# Patient Record
Sex: MALE | URBAN - METROPOLITAN AREA
[De-identification: names, ages, dates, MRNs, and addresses within clinical notes are randomized per-mention and may not be internally consistent; named-entity substitution may affect disease eponyms.]

---

## 2021-04-13 DIAGNOSIS — Z23 ENCOUNTER FOR IMMUNIZATION: ICD-10-CM

## 2023-09-27 ENCOUNTER — BMR PREADMISSION ASSESSMENT (INPATIENT)
Dept: ADMISSIONS | Facility: REHABILITATION | Age: 78
End: 2023-09-27
Payer: MEDICARE

## 2025-07-21 ENCOUNTER — HOSPITAL ENCOUNTER (INPATIENT)
Age: 80
LOS: 2 days | Discharge: HOME HEALTH | DRG: 812 | End: 2025-07-23
Attending: INTERNAL MEDICINE | Admitting: INTERNAL MEDICINE
Payer: MEDICARE

## 2025-07-21 DIAGNOSIS — K92.2 GIB (GASTROINTESTINAL BLEEDING): Primary | ICD-10-CM

## 2025-07-21 DIAGNOSIS — F03.90 DEMENTIA (HCC): ICD-10-CM

## 2025-07-21 DIAGNOSIS — D64.9 ANEMIA: ICD-10-CM

## 2025-07-21 PROCEDURE — 83880 ASSAY OF NATRIURETIC PEPTIDE: CPT

## 2025-07-21 PROCEDURE — 9990

## 2025-07-21 PROCEDURE — 86901 BLOOD TYPING SEROLOGIC RH(D): CPT

## 2025-07-21 PROCEDURE — 30233N1 TRANSFUSION OF NONAUTOLOGOUS RED BLOOD CELLS INTO PERIPHERAL VEIN, PERCUTANEOUS APPROACH: ICD-10-PCS | Performed by: INTERNAL MEDICINE

## 2025-07-21 PROCEDURE — 83735 ASSAY OF MAGNESIUM: CPT

## 2025-07-21 PROCEDURE — 86920 COMPATIBILITY TEST SPIN: CPT

## 2025-07-21 PROCEDURE — 82728 ASSAY OF FERRITIN: CPT

## 2025-07-21 PROCEDURE — 71045 X-RAY EXAM CHEST 1 VIEW: CPT

## 2025-07-21 PROCEDURE — 85025 COMPLETE CBC W/AUTO DIFF WBC: CPT

## 2025-07-21 PROCEDURE — 86850 RBC ANTIBODY SCREEN: CPT

## 2025-07-21 PROCEDURE — 85610 PROTHROMBIN TIME: CPT

## 2025-07-21 PROCEDURE — 80053 COMPREHEN METABOLIC PANEL: CPT

## 2025-07-21 PROCEDURE — 99291 CRITICAL CARE FIRST HOUR: CPT

## 2025-07-21 PROCEDURE — 71010: CPT

## 2025-07-21 PROCEDURE — P9016 RBC LEUKOCYTES REDUCED: HCPCS

## 2025-07-21 PROCEDURE — 93005 ELECTROCARDIOGRAM TRACING: CPT

## 2025-07-21 PROCEDURE — 86900 BLOOD TYPING SEROLOGIC ABO: CPT

## 2025-07-21 PROCEDURE — 84466 ASSAY OF TRANSFERRIN: CPT

## 2025-07-21 PROCEDURE — 36415 COLL VENOUS BLD VENIPUNCTURE: CPT

## 2025-07-21 PROCEDURE — 85730 THROMBOPLASTIN TIME PARTIAL: CPT

## 2025-07-21 PROCEDURE — 96374 THER/PROPH/DIAG INJ IV PUSH: CPT

## 2025-07-21 PROCEDURE — 83540 ASSAY OF IRON: CPT

## 2025-07-22 PROBLEM — M10.9 GOUT: Status: ACTIVE | Noted: 2025-07-22

## 2025-07-22 PROBLEM — I50.9 CONGESTIVE HEART FAILURE (CHF) (HCC): Status: ACTIVE | Noted: 2025-07-22

## 2025-07-22 PROBLEM — D64.9 SYMPTOMATIC ANEMIA: Status: ACTIVE | Noted: 2025-07-22

## 2025-07-22 PROBLEM — F03.90 DEMENTIA (HCC): Status: ACTIVE | Noted: 2025-07-22

## 2025-07-22 LAB
ALBUMIN SERPL BCG-MCNC: 3.4 G/DL (ref 3.5–5.2)
ALP SERPL-CCNC: 200 U/L (ref 35–160)
ALT SERPL W P-5'-P-CCNC: 15 U/L (ref 5–33)
ANION GAP SERPL CALCULATED.3IONS-SCNC: 12 MMOL/L (ref 7–16)
AST SERPL W P-5'-P-CCNC: 25 U/L (ref 5–40)
BASOPHILS # BLD AUTO: 0.06 THOUSANDS/ÂΜL (ref 0–0.1)
BASOPHILS NFR BLD AUTO: 1 % (ref 0–1)
BILIRUB SERPL-MCNC: 1.5 MG/DL (ref 0.2–1.2)
BUN SERPL-MCNC: 38 MG/DL (ref 8–23)
CALCIUM ALBUM COR SERPL-MCNC: 9.6 MG/DL (ref 8.3–10.1)
CALCIUM SERPL-MCNC: 9.1 MG/DL (ref 8.6–10.2)
CHLORIDE SERPL-SCNC: 99 MMOL/L (ref 98–107)
CHOLEST SERPL-MCNC: 99 MG/DL (ref 140–200)
CO2 SERPL-SCNC: 30 MMOL/L (ref 22–29)
CREAT SERPL-MCNC: 1.71 MG/DL (ref 0.7–1.2)
EOSINOPHIL # BLD AUTO: 0.15 THOUSAND/ÂΜL (ref 0–0.61)
EOSINOPHIL NFR BLD AUTO: 2 % (ref 0–6)
ERYTHROCYTE [DISTWIDTH] IN BLOOD BY AUTOMATED COUNT: 18.3 % (ref 11.6–15.1)
GFR SERPL CREATININE-BSD FRML MDRD: 37 ML/MIN/1.73SQ M
GLUCOSE SERPL-MCNC: 111 MG/DL (ref 65–140)
HCT VFR BLD AUTO: 24.1 % (ref 36.5–49.3)
HCT VFR BLD AUTO: 27.1 % (ref 36.5–49.3)
HCT VFR BLD AUTO: 27.4 % (ref 36.5–49.3)
HDLC SERPL-MCNC: 55 MG/DL (ref 40–60)
HGB BLD-MCNC: 7.6 G/DL (ref 12–17)
HGB BLD-MCNC: 8.4 G/DL (ref 12–17)
HGB BLD-MCNC: 8.5 G/DL (ref 12–17)
IMM GRANULOCYTES # BLD AUTO: 0.03 THOUSAND/UL (ref 0–0.2)
IMM GRANULOCYTES NFR BLD AUTO: 0 % (ref 0–2)
LDLC SERPL CALC-MCNC: 36 MG/DL (ref 0–100)
LYMPHOCYTES # BLD AUTO: 1.87 THOUSANDS/ÂΜL (ref 0.6–4.47)
LYMPHOCYTES NFR BLD AUTO: 27 % (ref 14–44)
MCH RBC QN AUTO: 23.9 PG (ref 26.8–34.3)
MCHC RBC AUTO-ENTMCNC: 31.5 G/DL (ref 31.4–37.4)
MCV RBC AUTO: 76 FL (ref 82–98)
MONOCYTES # BLD AUTO: 1.59 THOUSAND/ÂΜL (ref 0.17–1.22)
MONOCYTES NFR BLD AUTO: 23 % (ref 4–12)
NEUTROPHILS # BLD AUTO: 3.2 THOUSANDS/ÂΜL (ref 1.85–7.62)
NEUTS SEG NFR BLD AUTO: 47 % (ref 43–75)
NONHDLC SERPL-MCNC: 44 MG/DL
NRBC BLD AUTO-RTO: 1 /100 WBCS
PLATELET # BLD AUTO: 402 THOUSANDS/UL (ref 149–390)
PMV BLD AUTO: 9.7 FL (ref 8.9–12.7)
POTASSIUM SERPL-SCNC: 3.3 MMOL/L (ref 3.5–5.1)
PROT SERPL-MCNC: 6.2 G/DL (ref 6.4–8.3)
RBC # BLD AUTO: 3.18 MILLION/UL (ref 3.88–5.62)
SODIUM SERPL-SCNC: 141 MMOL/L (ref 136–145)
TRIGL SERPL-MCNC: 41 MG/DL (ref 60–150)
WBC # BLD AUTO: 6.9 THOUSAND/UL (ref 4.31–10.16)

## 2025-07-22 PROCEDURE — 80061 LIPID PANEL: CPT | Performed by: INTERNAL MEDICINE

## 2025-07-22 PROCEDURE — 80053 COMPREHEN METABOLIC PANEL: CPT | Performed by: INTERNAL MEDICINE

## 2025-07-22 PROCEDURE — 85025 COMPLETE CBC W/AUTO DIFF WBC: CPT | Performed by: INTERNAL MEDICINE

## 2025-07-22 PROCEDURE — 85014 HEMATOCRIT: CPT | Performed by: INTERNAL MEDICINE

## 2025-07-22 PROCEDURE — 85018 HEMOGLOBIN: CPT | Performed by: INTERNAL MEDICINE

## 2025-07-22 RX ORDER — MIRABEGRON 50 MG/1
50 TABLET, FILM COATED, EXTENDED RELEASE ORAL DAILY
COMMUNITY

## 2025-07-22 RX ORDER — ACETAMINOPHEN 325 MG/1
650 TABLET ORAL EVERY 4 HOURS PRN
COMMUNITY

## 2025-07-22 RX ORDER — GABAPENTIN 300 MG/1
300 CAPSULE ORAL
Status: DISCONTINUED | OUTPATIENT
Start: 2025-07-22 | End: 2025-07-23 | Stop reason: HOSPADM

## 2025-07-22 RX ORDER — REVEFENACIN 175 UG/3ML
175 SOLUTION RESPIRATORY (INHALATION) DAILY
COMMUNITY

## 2025-07-22 RX ORDER — RAMELTEON 8 MG/1
8 TABLET ORAL
Status: DISCONTINUED | OUTPATIENT
Start: 2025-07-22 | End: 2025-07-22

## 2025-07-22 RX ORDER — ESCITALOPRAM OXALATE 20 MG/1
20 TABLET ORAL DAILY
COMMUNITY

## 2025-07-22 RX ORDER — ESCITALOPRAM OXALATE 10 MG/1
20 TABLET ORAL DAILY
Status: DISCONTINUED | OUTPATIENT
Start: 2025-07-22 | End: 2025-07-23 | Stop reason: HOSPADM

## 2025-07-22 RX ORDER — GABAPENTIN 300 MG/1
300 CAPSULE ORAL
COMMUNITY

## 2025-07-22 RX ORDER — ACETAMINOPHEN 650 MG/1
650 SUPPOSITORY RECTAL EVERY 4 HOURS PRN
Status: DISCONTINUED | OUTPATIENT
Start: 2025-07-22 | End: 2025-07-23 | Stop reason: HOSPADM

## 2025-07-22 RX ORDER — PRAVASTATIN SODIUM 20 MG
20 TABLET ORAL DAILY
Status: DISCONTINUED | OUTPATIENT
Start: 2025-07-22 | End: 2025-07-23 | Stop reason: HOSPADM

## 2025-07-22 RX ORDER — ACETAMINOPHEN 325 MG/1
650 TABLET ORAL EVERY 4 HOURS PRN
Status: DISCONTINUED | OUTPATIENT
Start: 2025-07-22 | End: 2025-07-23 | Stop reason: HOSPADM

## 2025-07-22 RX ORDER — RIVASTIGMINE TARTRATE 4.5 MG/1
4.5 CAPSULE ORAL 2 TIMES DAILY
COMMUNITY

## 2025-07-22 RX ORDER — ALLOPURINOL 100 MG/1
200 TABLET ORAL DAILY
Status: DISCONTINUED | OUTPATIENT
Start: 2025-07-22 | End: 2025-07-23 | Stop reason: HOSPADM

## 2025-07-22 RX ORDER — ONDANSETRON 2 MG/ML
4 INJECTION INTRAMUSCULAR; INTRAVENOUS EVERY 6 HOURS PRN
Status: DISCONTINUED | OUTPATIENT
Start: 2025-07-22 | End: 2025-07-23 | Stop reason: HOSPADM

## 2025-07-22 RX ORDER — MIRABEGRON 50 MG/1
50 TABLET, FILM COATED, EXTENDED RELEASE ORAL DAILY
Status: DISCONTINUED | OUTPATIENT
Start: 2025-07-22 | End: 2025-07-23 | Stop reason: HOSPADM

## 2025-07-22 RX ORDER — BUDESONIDE AND FORMOTEROL FUMARATE DIHYDRATE 80; 4.5 UG/1; UG/1
2 AEROSOL RESPIRATORY (INHALATION) 2 TIMES DAILY
COMMUNITY

## 2025-07-22 RX ORDER — RIVASTIGMINE TARTRATE 1.5 MG/1
4.5 CAPSULE ORAL 2 TIMES DAILY
Status: DISCONTINUED | OUTPATIENT
Start: 2025-07-22 | End: 2025-07-23 | Stop reason: HOSPADM

## 2025-07-22 RX ORDER — PRAVASTATIN SODIUM 20 MG
20 TABLET ORAL DAILY
COMMUNITY

## 2025-07-22 RX ORDER — LANOLIN ALCOHOL/MO/W.PET/CERES
1000 CREAM (GRAM) TOPICAL DAILY
COMMUNITY

## 2025-07-22 RX ORDER — MIDODRINE HYDROCHLORIDE 2.5 MG/1
2.5 TABLET ORAL DAILY
COMMUNITY

## 2025-07-22 RX ORDER — MIDODRINE HYDROCHLORIDE 5 MG/1
2.5 TABLET ORAL DAILY
Status: DISCONTINUED | OUTPATIENT
Start: 2025-07-22 | End: 2025-07-23 | Stop reason: HOSPADM

## 2025-07-22 RX ORDER — BUMETANIDE 2 MG/1
2 TABLET ORAL 2 TIMES DAILY
COMMUNITY

## 2025-07-22 RX ORDER — POLYETHYLENE GLYCOL 3350 17 G/17G
17 POWDER, FOR SOLUTION ORAL DAILY
COMMUNITY

## 2025-07-22 RX ORDER — POTASSIUM CHLORIDE 750 MG/1
20 CAPSULE, EXTENDED RELEASE ORAL ONCE
Status: COMPLETED | OUTPATIENT
Start: 2025-07-22 | End: 2025-07-22

## 2025-07-22 RX ORDER — BUMETANIDE 2 MG/1
2 TABLET ORAL 2 TIMES DAILY
Status: DISCONTINUED | OUTPATIENT
Start: 2025-07-22 | End: 2025-07-23 | Stop reason: HOSPADM

## 2025-07-22 RX ORDER — MAGNESIUM HYDROXIDE/ALUMINUM HYDROXICE/SIMETHICONE 120; 1200; 1200 MG/30ML; MG/30ML; MG/30ML
15 SUSPENSION ORAL
Status: DISCONTINUED | OUTPATIENT
Start: 2025-07-22 | End: 2025-07-23 | Stop reason: HOSPADM

## 2025-07-22 RX ORDER — ALLOPURINOL 100 MG/1
200 TABLET ORAL DAILY
COMMUNITY

## 2025-07-22 RX ADMIN — ESCITALOPRAM OXALATE 20 MG: 10 TABLET ORAL at 14:06

## 2025-07-22 RX ADMIN — PRAVASTATIN SODIUM 20 MG: 20 TABLET ORAL at 16:30

## 2025-07-22 RX ADMIN — POTASSIUM CHLORIDE 20 MEQ: 750 CAPSULE, EXTENDED RELEASE ORAL at 16:30

## 2025-07-22 RX ADMIN — ALLOPURINOL 200 MG: 100 TABLET ORAL at 14:07

## 2025-07-22 RX ADMIN — BUMETANIDE 2 MG: 2 TABLET ORAL at 19:10

## 2025-07-22 RX ADMIN — GABAPENTIN 300 MG: 300 CAPSULE ORAL at 21:04

## 2025-07-22 RX ADMIN — RIVASTIGMINE TARTRATE 4.5 MG: 1.5 CAPSULE ORAL at 19:10

## 2025-07-22 RX ADMIN — MIDODRINE HYDROCHLORIDE 2.5 MG: 5 TABLET ORAL at 14:54

## 2025-07-22 NOTE — ASSESSMENT & PLAN NOTE
Wife reports that patient has been feeling very tired  Hemo-globin was found to be low  Recently had nasal bleed, no active bleed visible as of now  Status post blood transfusion for possible symptomatic anemia  Still occult was positive, GI consulted  Monitor H&H  If hemoglobin less than 7  Recently has heart doctor has reduced her Xarelto from 20 mg to 10 mg for his A-fib

## 2025-07-22 NOTE — PROGRESS NOTES
Progress Note - Hospitalist   Name: Ben Alcaraz 79 y.o. male I MRN: [id number]  Unit/Bed#: 4 D 486-01 I Date of Admission: 7/21/2025   Date of Service: 7/22/2025 I Hospital Day: 1    Assessment & Plan  Symptomatic anemia  Wife reports that patient has been feeling very tired  Hemo-globin was found to be low  Recently had nasal bleed, no active bleed visible as of now  Status post blood transfusion for possible symptomatic anemia  Still occult was positive, GI consulted  Monitor H&H  If hemoglobin less than 7  Recently has heart doctor has reduced her Xarelto from 20 mg to 10 mg for his A-fib    Dementia (HCC)    Congestive heart failure (CHF) ([facility])  Wt Readings from Last 3 Encounters:   07/22/25 87.1 kg (192 lb)     Continue home medications Bumex        Gout  Continue allopurinol    VTE Pharmacologic Prophylaxis:   Moderate Risk (Score 3-4) - Pharmacological DVT Prophylaxis Ordered: SCDs only as patient came with symptomatic anemia and possible GI bleed so chemical prophylaxis at this time before GI evaluation is contraindicated.        Education and Discussions with Family / Patient: Updated  (wife) at bedside.    Current Length of Stay: 1 day(s)  Current Patient Status: Inpatient   Certification Statement: Monitoring of his anemia, transfusion and GI evaluation.  discharge Plan: Anticipate discharge in 24-48 hrs to home.    Code Status: No Order    Subjective   Patient seen and examined bedside denies any complaints to me.    Objective :  Temp:  [97.5 °F (36.4 °C)-97.6 °F (36.4 °C)] 97.6 °F (36.4 °C)  HR:  [68-71] 68  BP: (107-113)/(64-65) 107/64  Resp:  [18-19] 18  SpO2:  [100 %] 100 %  O2 Device: None (Room air)    Body mass index is 24.65 kg/m².     Input and Output Summary (last 24 hours):     Intake/Output Summary (Last 24 hours) at 7/22/2025 1949  Last data filed at 7/22/2025 0601  Gross per 24 hour   Intake 375 ml   Output 450 ml   Net -75 ml       Physical Exam  NCAT  Moist mucous  membranes  S1-S2 normal  Clear lungs  Soft abdomen nontender positive bowel sounds  Alert oriented 1-2.  Dementia  No motor deficits    Lines/Drains:              Lab Results: I have reviewed the following results:   Results from last 7 days   Lab Units 07/22/25  1729 07/22/25  1204 07/22/25  0320   WBC Thousand/uL  --   --  6.90   HEMOGLOBIN g/dL 8.4*   < > 7.6*   HEMATOCRIT % 27.4*   < > 24.1*   PLATELETS Thousands/uL  --   --  402*   SEGS PCT %  --   --  47   LYMPHO PCT %  --   --  27   MONO PCT %  --   --  23*   EOS PCT %  --   --  2    < > = values in this interval not displayed.     Results from last 7 days   Lab Units 07/22/25  0320   SODIUM mmol/L 141   POTASSIUM mmol/L 3.3*   CHLORIDE mmol/L 99   CO2 mmol/L 30*   BUN mg/dL 38*   CREATININE mg/dL 1.71*   ANION GAP mmol/L 12   CALCIUM mg/dL 9.1   ALBUMIN g/dL 3.4*   TOTAL BILIRUBIN mg/dL 1.50*   ALK PHOS U/L 200*   ALT U/L 15   AST U/L 25   GLUCOSE RANDOM mg/dL 111                                   Last 24 Hours Medication List:     Current Facility-Administered Medications:     acetaminophen (TYLENOL) tablet 650 mg, Q4H PRN **OR** acetaminophen (TYLENOL) rectal suppository 650 mg, Q4H PRN    allopurinol (ZYLOPRIM) tablet 200 mg, Daily    aluminum-magnesium hydroxide-simethicone (MAALOX) oral suspension 15 mL, Q1H PRN    bumetanide (BUMEX) tablet 2 mg, BID    escitalopram (LEXAPRO) tablet 20 mg, Daily    gabapentin (NEURONTIN) capsule 300 mg, HS    melatonin tablet 3 mg, HS PRN    midodrine (PROAMATINE) tablet 2.5 mg, Daily    Mirabegron ER TB24 50 mg, Daily    ondansetron (ZOFRAN) injection 4 mg, Q6H PRN    pravastatin (PRAVACHOL) tablet 20 mg, Daily    [Held by provider] rivaroxaban (XARELTO) tablet 15 mg, After Dinner    rivastigmine (EXELON) capsule 4.5 mg, BID    Administrative Statements   Today, Patient Was Seen By: Dora Steven MD      **Please Note: This note may have been constructed using a voice recognition system.**

## 2025-07-22 NOTE — PLAN OF CARE
Problem: PAIN - ADULT  Goal: Verbalizes/displays adequate comfort level or baseline comfort level  Description: Interventions:  - Encourage patient to monitor pain and request assistance  - Assess pain using appropriate pain scale  - Administer analgesics as ordered based on type and severity of pain and evaluate response  - Implement non-pharmacological measures as appropriate and evaluate response  - Consider cultural and social influences on pain and pain management  - Notify physician/advanced practitioner if interventions unsuccessful or patient reports new pain  - Educate patient/family on pain management process including their role and importance of  reporting pain   - Provide non-pharmacologic/complimentary pain relief interventions  Outcome: Progressing     Problem: INFECTION - ADULT  Goal: Absence or prevention of progression during hospitalization  Description: INTERVENTIONS:  - Assess and monitor for signs and symptoms of infection  - Monitor lab/diagnostic results  - Monitor all insertion sites, i.e. indwelling lines, tubes, and drains  - Monitor endotracheal if appropriate and nasal secretions for changes in amount and color  - [place] appropriate cooling/warming therapies per order  - Administer medications as ordered  - Instruct and encourage patient and family to use good hand hygiene technique  - Identify and instruct in appropriate isolation precautions for identified infection/condition  Outcome: Progressing  Goal: Absence of fever/infection during neutropenic period  Description: INTERVENTIONS:  - Monitor WBC  - Perform strict hand hygiene  - Limit to healthy visitors only  - No plants, dried, fresh or silk flowers with gamboa in patient room  Outcome: Progressing     Problem: SAFETY ADULT  Goal: Patient will remain free of falls  Description: INTERVENTIONS:  - Educate patient/family on patient safety including physical limitations  - Instruct patient to call for assistance with activity   -  Consider consulting OT/PT to assist with strengthening/mobility based on AM PAC & JH-HLM score  - Consult OT/PT to assist with strengthening/mobility   - Keep Call bell within reach  - Keep bed low and locked with side rails adjusted as appropriate  - Keep care items and personal belongings within reach  - Initiate and maintain comfort rounds  - Make Fall Risk Sign visible to staff  - Offer Toileting every 2 Hours, in advance of need  - Initiate/Maintain bed/chair alarm  - Apply yellow socks and bracelet for high fall risk patients  - Consider moving patient to room near nurses station  Outcome: Progressing  Goal: Maintain or return to baseline ADL function  Description: INTERVENTIONS:  -  Assess patient's ability to carry out ADLs; assess patient's baseline for ADL function and identify physical deficits which impact ability to perform ADLs (bathing, care of mouth/teeth, toileting, grooming, dressing, etc.)  - Assess/evaluate cause of self-care deficits   - Assess range of motion  - Assess patient's mobility; develop plan if impaired  - Assess patient's need for assistive devices and provide as appropriate  - Encourage maximum independence but intervene and supervise when necessary  - Involve family in performance of ADLs  - Assess for home care needs following discharge   - Consider OT consult to assist with ADL evaluation and planning for discharge  - Provide patient education as appropriate  - Monitor functional capacity and physical performance, use of AM PAC & JH-HLM   - Monitor gait, balance and fatigue with ambulation    Outcome: Progressing  Goal: Maintains/Returns to pre admission functional level  Description: INTERVENTIONS:  - Perform AM-PAC 6 Click Basic Mobility/ Daily Activity assessment daily.  - Set and communicate daily mobility goal to care team and patient/family/caregiver.   - Collaborate with rehabilitation services on mobility goals if consulted  Patient ambulates to bathroom x1 assist   -  Record patient progress and toleration of activity level   Outcome: Progressing     Problem: DISCHARGE PLANNING  Goal: Discharge to home or other facility with appropriate resources  Description: INTERVENTIONS:  - Identify barriers to discharge w/patient and caregiver  - Arrange for needed discharge resources and transportation as appropriate  - Identify discharge learning needs (meds, wound care, etc.)  - Arrange for interpretive services to assist at discharge as needed  - Refer to Case Management Department for coordinating discharge planning if the patient needs post-hospital services based on physician/advanced practitioner order or complex needs related to functional status, cognitive ability, or social support system  Outcome: Progressing     Problem: Knowledge Deficit  Goal: Patient/family/caregiver demonstrates understanding of disease process, treatment plan, medications, and discharge instructions  Description: Complete learning assessment and assess knowledge base.  Interventions:  - Provide teaching at level of understanding  - Provide teaching via preferred learning methods  Outcome: Progressing

## 2025-07-22 NOTE — ASSESSMENT & PLAN NOTE
Wt Readings from Last 3 Encounters:   07/22/25 87.1 kg (192 lb)     Continue home medications Bumex

## 2025-07-22 NOTE — CASE MANAGEMENT
Case Management Assessment & Discharge Planning Note    Patient name Ben Alcaraz  Location 4 D 486/4 D 486-01 MRN [id number]  : 1945 Date 2025       Current Admission Date: 2025  Current Admission Diagnosis:Low hemoglobin   There are no active problems to display for this patient.     LOS (days): 1  Geometric Mean LOS (GMLOS) (days):   Days to GMLOS:     OBJECTIVE:    Risk of Unplanned Readmission Score: 8.35         Current admission status: Inpatient       Preferred Pharmacy:   [facility]/pharmacy #[id number] - [place] - [address]  [address]  [place] [address]  Phone: [phone] Fax: [phone]    Primary Care Provider: Elaine Cardenas DO    Primary Insurance: MEDICARE  Secondary Insurance: AET[employer]    ASSESSMENT:  Active Health Care Proxies    There are no active Health Care Proxies on file.       Advance Directives  Does patient have a Health Care POA?: Yes (Documentation no in EHR.)  Does patient have Advance Directives?: Yes  Advance Directives: Power of  for health care  Primary Contact: Tanya Alcaraz, wife              Patient Information  Admitted from:: Home  Mental Status: Alert  During Assessment patient was accompanied by: Spouse  Assessment information provided by:: Patient, Spouse  Primary Caregiver: Self  Support Systems: Spouse/significant other  County of Residence: [place]  [place] [facility] do you live in?: [place]  Home entry access options. Select all that apply.: Stairs  Number of steps to enter home.: 1  Do the steps have railings?: No  Type of Current Residence: [address] home  Upon entering residence, is there a bedroom on the main floor (no further steps)?: Yes  Upon entering residence, is there a bathroom on the main floor (no further steps)?: Yes  Living Arrangements: Lives w/ Spouse/significant other  Is patient a ?: No    Activities of Daily Living Prior to Admission  Functional Status: Independent  Completes ADLs  independently?: Yes  Ambulates independently?: Yes  Does patient currently own DME?: No  Does patient have a history of Outpatient Therapy (PT/OT)?: Yes ([facility])  Does the patient have a history of Short-Term Rehab?: Yes ([facility])  Does patient have a history of HHC?: Yes  Does patient currently have HHC?: No         Patient Information Continued  Income Source: Pension/[employer]  Does patient have prescription coverage?: Yes  Can the patient afford their medications and any related supplies (such as glucometers or test strips)?: Yes  Does patient receive dialysis treatments?: No  Does patient have a history of substance abuse?: No  Does patient have a history of Mental Health Diagnosis?: No         Means of Transportation  Means of Transport to Appts:: Family transport          DISCHARGE DETAILS:    Discharge planning discussed with:: Patient and spouse, Tanya.                                                                                               Additional Comments: Met with pt and wife at bedside. Wife concerned about how long pt stay would be due to history of inpatient stay causing delirium. Pt has medications to assist and she wanted CM to ensure these medications could be provided. CM followed up with RN who messaged Dr. Steven to see pt and wife to discuss.

## 2025-07-23 ENCOUNTER — TELEPHONE (OUTPATIENT)
Age: 80
End: 2025-07-23

## 2025-07-23 VITALS
DIASTOLIC BLOOD PRESSURE: 53 MMHG | OXYGEN SATURATION: 100 % | BODY MASS INDEX: 23.57 KG/M2 | TEMPERATURE: 98.3 F | RESPIRATION RATE: 18 BRPM | HEART RATE: 61 BPM | HEIGHT: 74 IN | WEIGHT: 183.64 LBS | SYSTOLIC BLOOD PRESSURE: 96 MMHG

## 2025-07-23 PROBLEM — I48.20 CHRONIC ATRIAL FIBRILLATION (HCC): Status: ACTIVE | Noted: 2025-07-23

## 2025-07-23 LAB
ANION GAP SERPL CALCULATED.3IONS-SCNC: 13 MMOL/L (ref 7–16)
BUN SERPL-MCNC: 24 MG/DL (ref 8–23)
CALCIUM SERPL-MCNC: 9.4 MG/DL (ref 8.6–10.2)
CHLORIDE SERPL-SCNC: 100 MMOL/L (ref 98–107)
CO2 SERPL-SCNC: 27 MMOL/L (ref 22–29)
CREAT SERPL-MCNC: 1.34 MG/DL (ref 0.7–1.2)
GFR SERPL CREATININE-BSD FRML MDRD: 50 ML/MIN/1.73SQ M
GLUCOSE SERPL-MCNC: 79 MG/DL (ref 65–140)
HCT VFR BLD AUTO: 27.4 % (ref 36.5–49.3)
HCT VFR BLD AUTO: 28 % (ref 36.5–49.3)
HCT VFR BLD AUTO: 30.2 % (ref 36.5–49.3)
HGB BLD-MCNC: 8.6 G/DL (ref 12–17)
HGB BLD-MCNC: 8.8 G/DL (ref 12–17)
HGB BLD-MCNC: 9.3 G/DL (ref 12–17)
MAGNESIUM SERPL-MCNC: 2.3 MG/DL (ref 1.6–2.6)
POTASSIUM SERPL-SCNC: 4.1 MMOL/L (ref 3.5–5.1)
SODIUM SERPL-SCNC: 140 MMOL/L (ref 136–145)
URATE SERPL-MCNC: 5.4 MG/DL (ref 3.4–7)

## 2025-07-23 PROCEDURE — 83735 ASSAY OF MAGNESIUM: CPT | Performed by: INTERNAL MEDICINE

## 2025-07-23 PROCEDURE — 84550 ASSAY OF BLOOD/URIC ACID: CPT | Performed by: INTERNAL MEDICINE

## 2025-07-23 PROCEDURE — 80048 BASIC METABOLIC PNL TOTAL CA: CPT | Performed by: INTERNAL MEDICINE

## 2025-07-23 PROCEDURE — 85018 HEMOGLOBIN: CPT | Performed by: INTERNAL MEDICINE

## 2025-07-23 PROCEDURE — 85014 HEMATOCRIT: CPT | Performed by: INTERNAL MEDICINE

## 2025-07-23 RX ORDER — PANTOPRAZOLE SODIUM 40 MG/1
40 TABLET, DELAYED RELEASE ORAL
Status: DISCONTINUED | OUTPATIENT
Start: 2025-07-23 | End: 2025-07-23 | Stop reason: HOSPADM

## 2025-07-23 RX ADMIN — ALLOPURINOL 200 MG: 100 TABLET ORAL at 10:15

## 2025-07-23 RX ADMIN — BUMETANIDE 2 MG: 2 TABLET ORAL at 10:15

## 2025-07-23 RX ADMIN — ACETAMINOPHEN 650 MG: 325 TABLET ORAL at 10:16

## 2025-07-23 RX ADMIN — RIVASTIGMINE TARTRATE 4.5 MG: 1.5 CAPSULE ORAL at 10:14

## 2025-07-23 RX ADMIN — MIDODRINE HYDROCHLORIDE 2.5 MG: 5 TABLET ORAL at 10:15

## 2025-07-23 RX ADMIN — ESCITALOPRAM OXALATE 20 MG: 10 TABLET ORAL at 10:14

## 2025-07-23 RX ADMIN — PANTOPRAZOLE SODIUM 40 MG: 40 TABLET, DELAYED RELEASE ORAL at 14:49

## 2025-07-23 NOTE — CONSULTS
Consultation - Gastroenterology   Name: Ben Alcaraz 79 y.o. male I MRN: [id number]  Unit/Bed#: 4 D 486-01 I Date of Admission: 7/21/2025   Date of Service: 7/23/2025 I Hospital Day: 2   Inpatient consult to gastroenterology  Consult performed by: CLARENCE Porter  Consult ordered by: Dora Steven MD        Physician Requesting Evaluation: Dora Steven MD   Reason for Evaluation / Principal Problem: GI bleed, anemia    Assessment & Plan  Symptomatic anemia  79 y.o. male with past medical history of mild dementia, GERD, atrial fibrillation on Xarelto, vascular insufficiency, factor V Leiden and possible decreased ferritin, patient presented to the emergency room with complaint of increased fatigue and tiredness.    GI consulted for GI bleed, anemia.    Per EMR patient's hemoglobin 10/26/2024 was 14.3.  While on vacation 6/23 with increased nosebleed patient went to the hospital and hemoglobin was 8.3.    On admission 7/22 patient's hemoglobin was 7.6.  Patient received 1 unit PRBCs.  Patient hemoglobin with a.m. labs today 9.3 however repeat drawn pending patient's discharge.  Repeat hemoglobin 8.8.  Consider symptoms 2/2, acute blood loss anemia, iron deficiency anemia, AVMs, CKD, gastric vs peptic ulcer.    Monitor hemoglobin, transfuse less than 7  Pantoprazole 40 mg p.o. daily, recommend continuing after discharge.  Evaluate iron panel however if patient is being discharged would order follow-up.  Monitor GI bleed/melena  Advance diet as tolerated     Discussed patient with Dr. Bryant.  Patient's wife would like to get patient discharged today if possible as long as medically stable.  Feels that he is getting increased anxiety with being in the hospital requiring her to stay overnight.  Current plan and would be to discharge patient and currently have him scheduled for outpatient EGD, 7/31.  Patient will have to hold Xarelto 2 days prior to EGD.  Patient's patient's cardiologist who prescribes his  Mattie is Dr.Eddy Pham, [facility] cardiology.    Chronic atrial fibrillation (HCC)  Patient currently taking Xarelto daily for chronic atrial fibrillation.  Patient also with history of vascular insufficiency and factor V Leiden.    Xarelto on hold  Dementia (HCC)  In patient's current setting and increased anxiety likely 2/2 dementia.  If patient is stable may discharge and follow-up as outpatient at patient's wife request.  Congestive heart failure (CHF) (HCC)  Wt Readings from Last 3 Encounters:   07/23/25 83.3 kg (183 lb 10.3 oz)             History of Present Illness   HPI:  Ben Alcaraz is a 79 y.o. male with past medical history of mild dementia, GERD, atrial fibrillation on Xarelto, vascular insufficiency, factor V Leiden and possible decreased ferritin, patient presented to the emergency room with complaint of increased fatigue and tiredness.  Patient is hard reading and poor historian with mild dementia however per patient's wife patient was on vacation on approximately 6/23 and had a increased nosebleed and went to the hospital.  His hemoglobin at that time was 8.3.  He was to follow-up with his PCP which he did.  Repeat hemoglobin was 7.2 on Friday.  Patient waited all weekend however did come to the emergency room on Monday.  Patient's hemoglobin on arrival to the emergency room was 7.6.  Patient did receive 1 unit PRBCs.  GI consulted for possible GI bleed and anemia.  On exam, patient denies nausea, vomiting or abdominal pain.  States he had been having daily normal bowel movements.  Denies hematochezia however may or may not have had increased melena.  Patient's wife believes he did have melena.  Patient's mother's recent colonoscopy 2017 noted nonbleeding angioectasia.    .    Review of Systems   Constitutional:  Positive for fatigue.        Mild dementia   Gastrointestinal:         Possible melena   Skin:  Positive for color change (Lower extremity PVD changes).     Medical History Review: I have  reviewed the patient's PMH, PSH, Social History, Family History, Meds, and Allergies     Objective :  Temp:  [97.6 °F (36.4 °C)-99.3 °F (37.4 °C)] 98.3 °F (36.8 °C)  HR:  [55-72] 61  BP: ()/(47-65) 96/53  Resp:  [18-19] 18  SpO2:  [94 %-100 %] 100 %  O2 Device: None (Room air)    Physical Exam  Vitals and nursing note reviewed.   Constitutional:       General: He is not in acute distress.     Appearance: He is well-developed.      Comments: Mild dementia   HENT:      Head: Normocephalic and atraumatic.     Eyes:      Conjunctiva/sclera: Conjunctivae normal.       Cardiovascular:      Rate and Rhythm: Normal rate and regular rhythm.      Heart sounds: No murmur heard.     Comments: Lower extremity PVD changes, history vascular insufficiency  Pulmonary:      Effort: Pulmonary effort is normal. No respiratory distress.      Breath sounds: Normal breath sounds.   Abdominal:      Palpations: Abdomen is soft.      Tenderness: There is no abdominal tenderness.     Musculoskeletal:         General: No swelling. Normal range of motion.      Cervical back: Neck supple.     Skin:     General: Skin is warm and dry.     Neurological:      Mental Status: He is alert and oriented to person, place, and time.     Psychiatric:         Mood and Affect: Mood normal.         Lab Results: I have reviewed the following results:CBC/BMP:   .     07/23/25  0645 07/23/25  1225   HGB 9.3* 8.8*   HCT 30.2* 28.0*   SODIUM 140  --    K 4.1  --      --    CO2 27  --    BUN 24*  --    CREATININE 1.34*  --    GLUC 79  --    MG 2.3  --     , Creatinine Clearance: Estimated Creatinine Clearance: 53.7 mL/min (A) (by C-G formula based on SCr of 1.34 mg/dL (H))., LFTs: No new results in last 24 hours.     Imaging Results Review: No pertinent imaging studies reviewed.  Other Study Results Review: Other studies reviewed include: Lab work, colonoscopy, H&P's

## 2025-07-23 NOTE — ASSESSMENT & PLAN NOTE
Wt Readings from Last 3 Encounters:   07/23/25 83.3 kg (183 lb 10.3 oz)     Continue home medications Bumex

## 2025-07-23 NOTE — ASSESSMENT & PLAN NOTE
Wife reports that patient has been feeling very tired  Hemo-globin was found to be low  Recently had nasal bleed, no active bleed visible as of now  Status post blood transfusion for possible symptomatic anemia  Still occult was positive, GI consulted  Monitor H&H  If hemoglobin less than 7  Recently has heart doctor has reduced her Xarelto from 20 mg to 10 mg for his A-fib  7/23: Patient seen by GI and has EGD scheduled on 7/31.  Patient instructed that she and his wife bedside that he needs to hold his Xarelto 2 days before the procedure and has to stay n.p.o. past midnight.  Currently patient is stable for discharge home

## 2025-07-23 NOTE — ASSESSMENT & PLAN NOTE
79 y.o. male with past medical history of mild dementia, GERD, atrial fibrillation on Xarelto, vascular insufficiency, factor V Leiden and possible decreased ferritin, patient presented to the emergency room with complaint of increased fatigue and tiredness.    GI consulted for GI bleed, anemia.    Per EMR patient's hemoglobin 10/26/2024 was 14.3.  While on vacation 6/23 with increased nosebleed patient went to the hospital and hemoglobin was 8.3.    On admission 7/22 patient's hemoglobin was 7.6.  Patient received 1 unit PRBCs.  Patient hemoglobin with a.m. labs today 9.3 however repeat drawn pending patient's discharge.  Repeat hemoglobin 8.8.  Consider symptoms 2/2, acute blood loss anemia, iron deficiency anemia, AVMs, CKD, gastric vs peptic ulcer.    Monitor hemoglobin, transfuse less than 7  Pantoprazole 40 mg p.o. daily, recommend continuing after discharge.  Evaluate iron panel however if patient is being discharged would order follow-up.  Monitor GI bleed/melena  Advance diet as tolerated     Discussed patient with Dr. Bryant.  Patient's wife would like to get patient discharged today if possible as long as medically stable.  Feels that he is getting increased anxiety with being in the hospital requiring her to stay overnight.  Current plan and would be to discharge patient and currently have him scheduled for outpatient EGD, 7/31.  Patient will have to hold Xarelto 2 days prior to EGD.  Patient's patient's cardiologist who prescribes his Xarelto is Dr.Eddy Pham, [facility] cardiology.

## 2025-07-23 NOTE — ASSESSMENT & PLAN NOTE
Patient currently taking Xarelto daily for chronic atrial fibrillation.  Patient also with history of vascular insufficiency and factor V Leiden.    Xarelto on hold

## 2025-07-23 NOTE — TELEPHONE ENCOUNTER
Patients GI provider:  Dr. GARCIA     Number to return call: [phone]    Reason for call: Pt Spouse called in she would like to add colonoscopy to upcoming procedure EGD at  please indicate if Colonoscopy is needed     Scheduled procedure/appointment date if applicable: Apt/procedure

## 2025-07-23 NOTE — ASSESSMENT & PLAN NOTE
In patient's current setting and increased anxiety likely 2/2 dementia.  If patient is stable may discharge and follow-up as outpatient at patient's wife request.

## 2025-07-23 NOTE — TELEPHONE ENCOUNTER
We had offered EGD/COLON but wife was afraid pt would not tolerate the prep. If she is willing to try, then I think that's better to just do both at once while xarelto is held. Can we add on both for next Thursday?

## 2025-07-23 NOTE — PLAN OF CARE
Problem: PAIN - ADULT  Goal: Verbalizes/displays adequate comfort level or baseline comfort level  Description: Interventions:  - Encourage patient to monitor pain and request assistance  - Assess pain using appropriate pain scale  - Administer analgesics as ordered based on type and severity of pain and evaluate response  - Implement non-pharmacological measures as appropriate and evaluate response  - Consider cultural and social influences on pain and pain management  - Notify physician/advanced practitioner if interventions unsuccessful or patient reports new pain  - Educate patient/family on pain management process including their role and importance of  reporting pain   - Provide non-pharmacologic/complimentary pain relief interventions  Outcome: Progressing     Problem: INFECTION - ADULT  Goal: Absence or prevention of progression during hospitalization  Description: INTERVENTIONS:  - Assess and monitor for signs and symptoms of infection  - Monitor lab/diagnostic results  - Monitor all insertion sites, i.e. indwelling lines, tubes, and drains  - Monitor endotracheal if appropriate and nasal secretions for changes in amount and color  - [place] appropriate cooling/warming therapies per order  - Administer medications as ordered  - Instruct and encourage patient and family to use good hand hygiene technique  - Identify and instruct in appropriate isolation precautions for identified infection/condition  Outcome: Progressing  Goal: Absence of fever/infection during neutropenic period  Description: INTERVENTIONS:  - Monitor WBC  - Perform strict hand hygiene  - Limit to healthy visitors only  - No plants, dried, fresh or silk flowers with gamboa in patient room  Outcome: Progressing     Problem: DISCHARGE PLANNING  Goal: Discharge to home or other facility with appropriate resources  Description: INTERVENTIONS:  - Identify barriers to discharge w/patient and caregiver  - Arrange for needed discharge resources  and transportation as appropriate  - Identify discharge learning needs (meds, wound care, etc.)  - Arrange for interpretive services to assist at discharge as needed  - Refer to Case Management Department for coordinating discharge planning if the patient needs post-hospital services based on physician/advanced practitioner order or complex needs related to functional status, cognitive ability, or social support system  Outcome: Progressing

## 2025-07-23 NOTE — DISCHARGE SUMMARY
Discharge Summary - Hospitalist   Name: Ben Alcaraz 79 y.o. male I MRN: [id number]  Unit/Bed#: 4 D 486-01 I Date of Admission: 7/21/2025   Date of Service: 7/23/2025 I Hospital Day: 2     Assessment & Plan  Symptomatic anemia  Wife reports that patient has been feeling very tired  Hemo-globin was found to be low  Recently had nasal bleed, no active bleed visible as of now  Status post blood transfusion for possible symptomatic anemia  Still occult was positive, GI consulted  Monitor H&H  If hemoglobin less than 7  Recently has heart doctor has reduced her Xarelto from 20 mg to 10 mg for his A-fib  7/23: Patient seen by GI and has EGD scheduled on 7/31.  Patient instructed that she and his wife bedside that he needs to hold his Xarelto 2 days before the procedure and has to stay n.p.o. past midnight.  Currently patient is stable for discharge home    Dementia (HCC)    Congestive heart failure (CHF) ([facility])  Wt Readings from Last 3 Encounters:   07/23/25 83.3 kg (183 lb 10.3 oz)     Continue home medications Bumex        Gout  Continue allopurinol  Chronic atrial fibrillation (HCC)       Medical Problems      Discharging Physician / Practitioner: Dora Steven MD  PCP: Elaine Cardenas DO  Admission Date:   Admission Orders (From admission, onward)       Ordered        07/22/25 0146  INPATIENT ADMISSION  Once                          Discharge Date: 07/23/25    Next Steps for Physician/AP Assuming Care:  GI for outpatient EGD scheduled on 731.              Consultations During Hospital Stay:  GI              Hospital Course:   Ben Alcaraz is a 79 y.o. male patient who originally presented to the hospital on 7/21/2025 due to with symptomatic anemia and was given blood transfusion.  The hospital course patient hemoglobin stayed stable and plan was to discharge the patient and follow-up as an outpatient for EGD scheduled on 731 with [facility] of [facility].        Please see above list of diagnoses and related plan  for additional information.     Discharge Day Visit / Exam:   Normo cephalic atraumatic  MMM  No JVD  S1-S2 normal  Clear lungs  Soft nontender positive bowel sounds  Alert and oriented x 1-2 baseline dementia no motor deficits  No peripheral edema    Discussion with Family: Updated  (wife) at bedside.    Discharge instructions/Information to patient and family:   See after visit summary for information provided to patient and family.      Provisions for Follow-Up Care:  See after visit summary for information related to follow-up care and any pertinent home health orders.      Mobility at time of Discharge:   Basic Mobility Inpatient Raw Score: 17  JH-HLM Goal: 5: Stand one or more mins  JH-HLM Achieved: 5: Stand (1 or more minutes)  HLM Goal achieved. Continue to encourage appropriate mobility.     Disposition:   Home with home PT        Administrative Statements   Discharge Statement:  I have spent a total time o?30 minsminutes in caring for this patient on the day of the visit/encounter. .    **Please Note: This note may have been constructed using a voice recognition system**

## 2025-07-24 ENCOUNTER — PREP FOR PROCEDURE (OUTPATIENT)
Dept: GASTROENTEROLOGY | Facility: CLINIC | Age: 80
End: 2025-07-24

## 2025-07-24 DIAGNOSIS — D64.9 SYMPTOMATIC ANEMIA: Primary | ICD-10-CM

## 2025-07-24 DIAGNOSIS — R19.5 OCCULT GI BLEEDING: ICD-10-CM

## 2025-07-24 NOTE — TELEPHONE ENCOUNTER
Scheduled date of COMBO (as of today):7/31/2025  Physician performing COMBO:MALINDA  Location of COMBO:[facility]  Bowel prep reviewed with patient:POOJA/GANESH SENT VIA EMAIL  Instructions reviewed with patient by:MAXINE  Clearances: XARELTO

## 2025-07-25 LAB
ATRIAL RATE: 65 BPM
P AXIS: 69 DEGREES
PR INTERVAL: 210 MS
QRS AXIS: -81 DEGREES
QRSD INTERVAL: 140 MS
QT INTERVAL: 442 MS
QTC INTERVAL: 460 MS
T WAVE AXIS: 87 DEGREES
VENTRICULAR RATE: 65 BPM

## 2025-07-25 PROCEDURE — 93010 ELECTROCARDIOGRAM REPORT: CPT | Performed by: INTERNAL MEDICINE

## 2025-07-31 ENCOUNTER — ANESTHESIA (OUTPATIENT)
Age: 80
End: 2025-07-31
Payer: MEDICARE

## 2025-07-31 ENCOUNTER — ANESTHESIA EVENT (OUTPATIENT)
Age: 80
End: 2025-07-31
Payer: MEDICARE

## 2025-07-31 ENCOUNTER — HOSPITAL ENCOUNTER (OUTPATIENT)
Age: 80
Setting detail: OUTPATIENT SURGERY
Discharge: HOME | End: 2025-07-31
Attending: STUDENT IN AN ORGANIZED HEALTH CARE EDUCATION/TRAINING PROGRAM
Payer: MEDICARE

## 2025-07-31 VITALS
RESPIRATION RATE: 18 BRPM | HEIGHT: 73 IN | DIASTOLIC BLOOD PRESSURE: 98 MMHG | HEART RATE: 67 BPM | TEMPERATURE: 97.6 F | BODY MASS INDEX: 25.27 KG/M2 | WEIGHT: 190.7 LBS | OXYGEN SATURATION: 95 % | SYSTOLIC BLOOD PRESSURE: 150 MMHG

## 2025-07-31 DIAGNOSIS — D64.9 SYMPTOMATIC ANEMIA: ICD-10-CM

## 2025-07-31 PROCEDURE — 88305 TISSUE EXAM BY PATHOLOGIST: CPT | Performed by: PATHOLOGY

## 2025-07-31 PROCEDURE — 43239 EGD BIOPSY SINGLE/MULTIPLE: CPT | Performed by: STUDENT IN AN ORGANIZED HEALTH CARE EDUCATION/TRAINING PROGRAM

## 2025-07-31 PROCEDURE — 45382 COLONOSCOPY W/CONTROL BLEED: CPT | Performed by: STUDENT IN AN ORGANIZED HEALTH CARE EDUCATION/TRAINING PROGRAM

## 2025-07-31 RX ORDER — SODIUM CHLORIDE, SODIUM LACTATE, POTASSIUM CHLORIDE, CALCIUM CHLORIDE 600; 310; 30; 20 MG/100ML; MG/100ML; MG/100ML; MG/100ML
INJECTION, SOLUTION INTRAVENOUS CONTINUOUS PRN
Status: DISCONTINUED | OUTPATIENT
Start: 2025-07-31 | End: 2025-07-31

## 2025-07-31 RX ORDER — SODIUM CHLORIDE, SODIUM LACTATE, POTASSIUM CHLORIDE, CALCIUM CHLORIDE 600; 310; 30; 20 MG/100ML; MG/100ML; MG/100ML; MG/100ML
25 INJECTION, SOLUTION INTRAVENOUS CONTINUOUS
Status: DISCONTINUED | OUTPATIENT
Start: 2025-07-31 | End: 2025-08-04 | Stop reason: HOSPADM

## 2025-07-31 RX ORDER — EPINEPHRINE 0.1 MG/ML
INJECTION INTRAVENOUS
Status: COMPLETED
Start: 2025-07-31 | End: 2025-07-31

## 2025-07-31 RX ORDER — PROPOFOL 10 MG/ML
INJECTION, EMULSION INTRAVENOUS AS NEEDED
Status: DISCONTINUED | OUTPATIENT
Start: 2025-07-31 | End: 2025-07-31

## 2025-07-31 RX ADMIN — PROPOFOL 20 MG: 10 INJECTION, EMULSION INTRAVENOUS at 09:48

## 2025-07-31 RX ADMIN — PROPOFOL 20 MG: 10 INJECTION, EMULSION INTRAVENOUS at 10:01

## 2025-07-31 RX ADMIN — EPINEPHRINE 0.2 MG: 0.1 INJECTION, SOLUTION INTRAVENOUS at 10:11

## 2025-07-31 RX ADMIN — PROPOFOL 70 MG: 10 INJECTION, EMULSION INTRAVENOUS at 09:37

## 2025-07-31 RX ADMIN — SODIUM CHLORIDE, SODIUM LACTATE, POTASSIUM CHLORIDE, AND CALCIUM CHLORIDE 25 ML/HR: 600; 310; 30; 20 INJECTION, SOLUTION INTRAVENOUS at 08:48

## 2025-07-31 RX ADMIN — PROPOFOL 60 MG: 10 INJECTION, EMULSION INTRAVENOUS at 09:54

## 2025-07-31 RX ADMIN — PROPOFOL 50 MG: 10 INJECTION, EMULSION INTRAVENOUS at 10:04

## 2025-07-31 RX ADMIN — PROPOFOL 70 MG: 10 INJECTION, EMULSION INTRAVENOUS at 09:33

## 2025-07-31 RX ADMIN — PROPOFOL 20 MG: 10 INJECTION, EMULSION INTRAVENOUS at 09:50

## 2025-07-31 RX ADMIN — SODIUM CHLORIDE, SODIUM LACTATE, POTASSIUM CHLORIDE, AND CALCIUM CHLORIDE: .6; .31; .03; .02 INJECTION, SOLUTION INTRAVENOUS at 09:17

## 2025-07-31 RX ADMIN — PROPOFOL 10 MG: 10 INJECTION, EMULSION INTRAVENOUS at 09:44

## 2025-07-31 RX ADMIN — PROPOFOL 30 MG: 10 INJECTION, EMULSION INTRAVENOUS at 09:58

## 2025-08-01 ENCOUNTER — APPOINTMENT (OUTPATIENT)
Age: 80
End: 2025-08-01
Payer: MEDICARE

## 2025-08-01 DIAGNOSIS — Z13.0 SCREENING FOR IRON DEFICIENCY ANEMIA: ICD-10-CM

## 2025-08-01 LAB
BASOPHILS # BLD AUTO: 0.06 THOUSANDS/ÂΜL (ref 0–0.1)
BASOPHILS NFR BLD AUTO: 1 % (ref 0–1)
EOSINOPHIL # BLD AUTO: 0.11 THOUSAND/ÂΜL (ref 0–0.61)
EOSINOPHIL NFR BLD AUTO: 2 % (ref 0–6)
ERYTHROCYTE [DISTWIDTH] IN BLOOD BY AUTOMATED COUNT: 19.5 % (ref 11.6–15.1)
FERRITIN SERPL-MCNC: 16 NG/ML (ref 30–336)
HCT VFR BLD AUTO: 25.3 % (ref 36.5–49.3)
HGB BLD-MCNC: 7.8 G/DL (ref 12–17)
IMM GRANULOCYTES # BLD AUTO: 0.03 THOUSAND/UL (ref 0–0.2)
IMM GRANULOCYTES NFR BLD AUTO: 1 % (ref 0–2)
IRON SATN MFR SERPL: 5 % (ref 15–50)
IRON SERPL-MCNC: 24 UG/DL (ref 50–212)
LYMPHOCYTES # BLD AUTO: 1.45 THOUSANDS/ÂΜL (ref 0.6–4.47)
LYMPHOCYTES NFR BLD AUTO: 23 % (ref 14–44)
MCH RBC QN AUTO: 22.9 PG (ref 26.8–34.3)
MCHC RBC AUTO-ENTMCNC: 30.8 G/DL (ref 31.4–37.4)
MCV RBC AUTO: 74 FL (ref 82–98)
MONOCYTES # BLD AUTO: 1.27 THOUSAND/ÂΜL (ref 0.17–1.22)
MONOCYTES NFR BLD AUTO: 20 % (ref 4–12)
NEUTROPHILS # BLD AUTO: 3.33 THOUSANDS/ÂΜL (ref 1.85–7.62)
NEUTS SEG NFR BLD AUTO: 53 % (ref 43–75)
NRBC BLD AUTO-RTO: 1 /100 WBCS
PLATELET # BLD AUTO: 474 THOUSANDS/UL (ref 149–390)
PMV BLD AUTO: 9.9 FL (ref 8.9–12.7)
RBC # BLD AUTO: 3.41 MILLION/UL (ref 3.88–5.62)
TIBC SERPL-MCNC: 518 UG/DL (ref 250–450)
TRANSFERRIN SERPL-MCNC: 370 MG/DL (ref 203–362)
UIBC SERPL-MCNC: 494 UG/DL (ref 155–355)
WBC # BLD AUTO: 6.25 THOUSAND/UL (ref 4.31–10.16)

## 2025-08-01 PROCEDURE — 85025 COMPLETE CBC W/AUTO DIFF WBC: CPT

## 2025-08-01 PROCEDURE — 36415 COLL VENOUS BLD VENIPUNCTURE: CPT

## 2025-08-01 PROCEDURE — 82728 ASSAY OF FERRITIN: CPT

## 2025-08-01 PROCEDURE — 83550 IRON BINDING TEST: CPT

## 2025-08-01 PROCEDURE — 83540 ASSAY OF IRON: CPT

## 2025-08-10 ENCOUNTER — APPOINTMENT (EMERGENCY)
Age: 80
End: 2025-08-10
Payer: MEDICARE

## 2025-08-10 ENCOUNTER — HOSPITAL ENCOUNTER (EMERGENCY)
Age: 80
Discharge: HOME | End: 2025-08-10
Attending: INTERNAL MEDICINE | Admitting: INTERNAL MEDICINE
Payer: MEDICARE

## 2025-08-20 ENCOUNTER — TELEPHONE (OUTPATIENT)
Age: 80
End: 2025-08-20

## 2025-08-22 ENCOUNTER — TELEPHONE (OUTPATIENT)
Dept: GASTROENTEROLOGY | Facility: CLINIC | Age: 80
End: 2025-08-22

## 2025-08-22 ENCOUNTER — TELEPHONE (OUTPATIENT)
Age: 80
End: 2025-08-22

## 2025-08-22 RX ORDER — POLYETHYLENE GLYCOL 3350, SODIUM SULFATE ANHYDROUS, SODIUM BICARBONATE, SODIUM CHLORIDE, POTASSIUM CHLORIDE 236; 22.74; 6.74; 5.86; 2.97 G/4L; G/4L; G/4L; G/4L; G/4L
2000 POWDER, FOR SOLUTION ORAL ONCE
Qty: 2000 ML | Refills: 0 | Status: SHIPPED | OUTPATIENT
Start: 2025-08-22 | End: 2025-08-22

## 2025-08-22 RX ORDER — SODIUM CHLORIDE, SODIUM LACTATE, POTASSIUM CHLORIDE, CALCIUM CHLORIDE 600; 310; 30; 20 MG/100ML; MG/100ML; MG/100ML; MG/100ML
125 INJECTION, SOLUTION INTRAVENOUS CONTINUOUS
OUTPATIENT
Start: 2025-08-22